# Patient Record
Sex: MALE | Race: WHITE | HISPANIC OR LATINO | URBAN - METROPOLITAN AREA
[De-identification: names, ages, dates, MRNs, and addresses within clinical notes are randomized per-mention and may not be internally consistent; named-entity substitution may affect disease eponyms.]

---

## 2020-12-19 ENCOUNTER — EMERGENCY (EMERGENCY)
Facility: HOSPITAL | Age: 60
LOS: 1 days | Discharge: HOME | End: 2020-12-19
Admitting: EMERGENCY MEDICINE
Payer: COMMERCIAL

## 2020-12-19 VITALS
WEIGHT: 175.05 LBS | OXYGEN SATURATION: 98 % | RESPIRATION RATE: 16 BRPM | HEIGHT: 68 IN | HEART RATE: 84 BPM | DIASTOLIC BLOOD PRESSURE: 111 MMHG | SYSTOLIC BLOOD PRESSURE: 150 MMHG | TEMPERATURE: 98 F
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
## 2020-12-19 DIAGNOSIS — Z20.828 CONTACT WITH AND (SUSPECTED) EXPOSURE TO OTHER VIRAL COMMUNICABLE DISEASES: ICD-10-CM

## 2020-12-19 DIAGNOSIS — R42 DIZZINESS AND GIDDINESS: ICD-10-CM

## 2020-12-19 DIAGNOSIS — Z23 ENCOUNTER FOR IMMUNIZATION: ICD-10-CM

## 2020-12-19 DIAGNOSIS — S01.81XA LACERATION WITHOUT FOREIGN BODY OF OTHER PART OF HEAD, INITIAL ENCOUNTER: ICD-10-CM
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
## 2020-12-19 DIAGNOSIS — S01.112A LACERATION WITHOUT FOREIGN BODY OF LEFT EYELID AND PERIOCULAR AREA, INITIAL ENCOUNTER: ICD-10-CM

## 2020-12-19 PROCEDURE — 99284 EMERGENCY DEPT VISIT MOD MDM: CPT | Mod: CR,25

## 2020-12-19 PROCEDURE — 70450 CT HEAD/BRAIN W/O DYE: CPT | Mod: 26

## 2020-12-19 PROCEDURE — 12052 INTMD RPR FACE/MM 2.6-5.0 CM: CPT

## 2020-12-19 RX ORDER — TETANUS TOXOID, REDUCED DIPHTHERIA TOXOID AND ACELLULAR PERTUSSIS VACCINE, ADSORBED 5; 2.5; 8; 8; 2.5 [IU]/.5ML; [IU]/.5ML; UG/.5ML; UG/.5ML; UG/.5ML
0.5 SUSPENSION INTRAMUSCULAR ONCE
Refills: 0 | Status: COMPLETED | OUTPATIENT
Start: 2020-12-19 | End: 2020-12-19

## 2020-12-19 RX ORDER — ACETAMINOPHEN 500 MG
650 TABLET ORAL ONCE
Refills: 0 | Status: COMPLETED | OUTPATIENT
Start: 2020-12-19 | End: 2020-12-19

## 2020-12-19 RX ADMIN — Medication 650 MILLIGRAM(S): at 23:25

## 2020-12-19 RX ADMIN — TETANUS TOXOID, REDUCED DIPHTHERIA TOXOID AND ACELLULAR PERTUSSIS VACCINE, ADSORBED 0.5 MILLILITER(S): 5; 2.5; 8; 8; 2.5 SUSPENSION INTRAMUSCULAR at 22:47

## 2020-12-19 RX ADMIN — Medication 650 MILLIGRAM(S): at 22:47

## 2020-12-19 NOTE — ED PROVIDER NOTE - CLINICAL SUMMARY MEDICAL DECISION MAKING FREE TEXT BOX
pt with laceration to the L facial region s/p witnessed mechanical fall at the bar tonight, wound copiously irrigated under high pressure and repaired at bedside, NV intact pre and post lac repair, CTH with no acute ICH, wound care instructions provided, instructed to return in 5-7 days for suture removal. Also requested for COVID swab for travel purposes.  Asymptomatic and swab obtained/sent

## 2020-12-19 NOTE — ED PROVIDER NOTE - NSFOLLOWUPINSTRUCTIONS_ED_ALL_ED_FT
NON-DISSOLVABLE SUTURES  * Please note minor swelling, redness, pain, itching, and occasional bleeding (that’s controlled by direct pressure) are common with all wounds and normally will go away as wound heals     • Keep dressing clean and dry for 24 hours   • May gently clean wound with soap and water after 24 hours to avoid crusting – PAT DRY after each wash  • Open wound to air or loosen Band-Aid to allow aeration   • Apply Bacitracin or Neosporin ointment twice daily    • Return in 5-7 days for suture removal    PLEASE SEEK MEDICAL ATTENTION OR RETURN TO ER IMMEDIATELY IF:  * Swelling, redness, or pain increases and cannot be controlled by prescribed pain medication  * Wound feels warm to touch   * Fever >100.4F  * Wound edges reopen/separate   * Foul smelling discharge from wound   * Uncontrolled bleeding with direct pressure  * Increased numbness/tingling/discoloration of wound site

## 2020-12-19 NOTE — ED PROVIDER NOTE - OBJECTIVE STATEMENT
61 yo M with PMHx of , last tetanus unknown, BIBA for 61 yo M with PMHx of GERD, last tetanus unknown, not on any AC, presenting c/o facial laceration s/p fall today.  Pt reports having heavy alcohol consumption tonight, went to the bathroom and hit his head against the ground and sustained laceration to the L eyebrow region.  +Witnessed by friend at the bar.  Denies prodromes, LOC, bleeding, dizziness, SOB, CP, palpitations, N/V, change in ROM/sensation, abdominal/back/neck pain, focal weakness, change in vision/mental status/speech, diplopia, paresthesia, and malaise. Pt is ambulatory s/p fall.

## 2020-12-19 NOTE — ED PROVIDER NOTE - PATIENT PORTAL LINK FT
You can access the FollowMyHealth Patient Portal offered by [facility] by registering at the following website: [email or web address]. By joining [employer]’s FollowMyHealth portal, you will also be able to view your health information using other applications (apps) compatible with our system.

## 2020-12-19 NOTE — ED PROVIDER NOTE - PHYSICAL EXAMINATION
Vital Signs - nursing notes reviewed and confirmed  Gen - WDWN M, NAD, comfortable and non-toxic appearing, speaking in full sentences   Skin - warm, dry, 4cm stellate laceration with spidering surrounding the wound, +active bleeding, no FB or bony exposure   HEENT - NC, +mild periorbital ecchymosis to the L lateral supraorbital region, PERRL, EOMI, no conjunctival injection, moist oral mucosa, TM intact b/l with good cone of lights, o/p clear with no erythema, edema, or exudate, uvula midline, airway patent, neck supple and NT, FROM  CV - S1S2, R/R/R  Resp - respiration non-labored, CTAB, symmetric bs b/l, no r/r/w  GI - NABS, soft, ND, NT, no rebound or guarding, no CVAT b/l   MS - w/w/p, no c/c/e, calves supple and NT, distal pulses symmetric b/l, brisk cap refills, +SILT  Neuro - AxOx3, no focal neuro deficits, CN II-XII grossly intact, cerebellar function intact, negative pronator drift, negative nystagmus, ambulatory with unsteady gait Vital Signs - nursing notes reviewed and confirmed  Gen - WDWN M, NAD, comfortable and non-toxic appearing, speaking in full sentences   Skin - warm, dry, 4cm stellate laceration with spidering of the laceration surrounding the wound, +active bleeding, no FB or bony exposure   HEENT - NC, +mild periorbital ecchymosis to the L lateral supraorbital region, PERRL, EOMI, no conjunctival injection, moist oral mucosa, TM intact b/l with good cone of lights, o/p clear with no erythema, edema, or exudate, uvula midline, airway patent, neck supple and NT, FROM  CV - S1S2, R/R/R  Resp - respiration non-labored, CTAB, symmetric bs b/l, no r/r/w  GI - NABS, soft, ND, NT, no rebound or guarding, no CVAT b/l   MS - w/w/p, no c/c/e, calves supple and NT, distal pulses symmetric b/l, brisk cap refills, +SILT  Neuro - AxOx3, no focal neuro deficits, CN II-XII grossly intact, cerebellar function intact, negative pronator drift, negative nystagmus, ambulatory with unsteady gait

## 2020-12-19 NOTE — ED ADULT TRIAGE NOTE - CHIEF COMPLAINT QUOTE
fall tonight outside bar, sustained lac above left eyebrow, denies loc, does not take blood thinners, pt intoxicated ++ fall tonight outside bar, sustained lac above left eyebrow, denies loc, does not take blood thinners, pt intoxicated ++, full recall of events, no histroy of hypertension

## 2020-12-19 NOTE — ED PROVIDER NOTE - CARE PLAN
Principal Discharge DX:	Facial laceration, initial encounter  Secondary Diagnosis:	Head trauma  Secondary Diagnosis:	Screening for viral disease

## 2020-12-20 VITALS
HEART RATE: 88 BPM | OXYGEN SATURATION: 99 % | TEMPERATURE: 98 F | DIASTOLIC BLOOD PRESSURE: 85 MMHG | RESPIRATION RATE: 16 BRPM | SYSTOLIC BLOOD PRESSURE: 130 MMHG

## 2020-12-20 LAB
GLUCOSE BLDC GLUCOMTR-MCNC: 93 MG/DL — SIGNIFICANT CHANGE UP (ref 70–99)
SARS-COV-2 RNA SPEC QL NAA+PROBE: SIGNIFICANT CHANGE UP

## 2020-12-20 NOTE — ED ADULT NURSE REASSESSMENT NOTE - NS ED NURSE REASSESS COMMENT FT1
Rcvd pt from [facility]OR[facility] Fonseca. Pt resting in stretcher, awake and alert and speaking in full and complete sentences. Pending radiology results.

## 2021-06-29 PROBLEM — K21.9 GASTRO-ESOPHAGEAL REFLUX DISEASE WITHOUT ESOPHAGITIS: Chronic | Status: ACTIVE | Noted: 2020-12-19

## 2021-07-01 ENCOUNTER — APPOINTMENT (OUTPATIENT)
Dept: OTOLARYNGOLOGY | Facility: CLINIC | Age: 61
End: 2021-07-01
Payer: COMMERCIAL

## 2021-07-01 VITALS
DIASTOLIC BLOOD PRESSURE: 87 MMHG | HEART RATE: 86 BPM | TEMPERATURE: 98.3 F | OXYGEN SATURATION: 99 % | SYSTOLIC BLOOD PRESSURE: 123 MMHG

## 2021-07-01 DIAGNOSIS — Z78.9 OTHER SPECIFIED HEALTH STATUS: ICD-10-CM

## 2021-07-01 DIAGNOSIS — J04.0 ACUTE LARYNGITIS: ICD-10-CM

## 2021-07-01 DIAGNOSIS — Z72.89 OTHER PROBLEMS RELATED TO LIFESTYLE: ICD-10-CM

## 2021-07-01 DIAGNOSIS — H92.02 OTALGIA, LEFT EAR: ICD-10-CM

## 2021-07-01 DIAGNOSIS — H66.90 OTITIS MEDIA, UNSPECIFIED, UNSPECIFIED EAR: ICD-10-CM

## 2021-07-01 DIAGNOSIS — Z82.49 FAMILY HISTORY OF ISCHEMIC HEART DISEASE AND OTHER DISEASES OF THE CIRCULATORY SYSTEM: ICD-10-CM

## 2021-07-01 PROBLEM — Z00.00 ENCOUNTER FOR PREVENTIVE HEALTH EXAMINATION: Status: ACTIVE | Noted: 2021-07-01

## 2021-07-01 PROCEDURE — 92557 COMPREHENSIVE HEARING TEST: CPT

## 2021-07-01 PROCEDURE — 99204 OFFICE O/P NEW MOD 45 MIN: CPT | Mod: 25

## 2021-07-01 PROCEDURE — 99072 ADDL SUPL MATRL&STAF TM PHE: CPT

## 2021-07-01 PROCEDURE — 31575 DIAGNOSTIC LARYNGOSCOPY: CPT

## 2021-07-01 PROCEDURE — 92550 TYMPANOMETRY & REFLEX THRESH: CPT

## 2021-07-01 NOTE — REVIEW OF SYSTEMS
[Patient Intake Form Reviewed] : Patient intake form was reviewed [Ear Pain] : ear pain [Hearing Loss] : hearing loss [Ear Noises] : ear noises [Nasal Congestion] : nasal congestion [Hoarseness] : hoarseness [Throat Pain] : throat pain [Throat Dryness] : throat dryness [As Noted in HPI] : as noted in HPI [Heartburn] : heartburn [Negative] : Heme/Lymph

## 2021-07-06 ENCOUNTER — APPOINTMENT (OUTPATIENT)
Dept: OTOLARYNGOLOGY | Facility: CLINIC | Age: 61
End: 2021-07-06
Payer: COMMERCIAL

## 2021-07-06 VITALS
HEIGHT: 68 IN | DIASTOLIC BLOOD PRESSURE: 87 MMHG | BODY MASS INDEX: 26.22 KG/M2 | OXYGEN SATURATION: 98 % | WEIGHT: 173 LBS | SYSTOLIC BLOOD PRESSURE: 144 MMHG | HEART RATE: 87 BPM | TEMPERATURE: 98.3 F

## 2021-07-06 PROCEDURE — 92550 TYMPANOMETRY & REFLEX THRESH: CPT

## 2021-07-06 PROCEDURE — 92557 COMPREHENSIVE HEARING TEST: CPT

## 2021-07-06 PROCEDURE — 99072 ADDL SUPL MATRL&STAF TM PHE: CPT

## 2021-07-06 PROCEDURE — 99213 OFFICE O/P EST LOW 20 MIN: CPT

## 2021-07-06 NOTE — PHYSICAL EXAM
[de-identified] :   Left ear Otitis Media . hearing loss [Normal] : mucosa is normal [Midline] : trachea located in midline position

## 2021-07-06 NOTE — REASON FOR VISIT
[Subsequent Evaluation] : a subsequent evaluation for [FreeTextEntry2] : Persistent ET dysfunction Lt and nasal obstr, improved slightly w steroids

## 2021-07-28 NOTE — HISTORY OF PRESENT ILLNESS
[de-identified] : 60 years old male patient with history of left ear otalgia for the past 2 weeks.   Patient is present today in the office with Resolving  Left ear Otitis Media .  Reflux  Laryngitis.

## 2021-07-28 NOTE — PROCEDURE
[Image(s) Captured] : image(s) captured and filed [Topical Lidocaine] : topical lidocaine [Oxymetazoline HCl] : oxymetazoline HCl [Flexible Endoscope] : examined with the flexible endoscope [Serial Number: ___] : Serial Number: [unfilled] [Glottis Arytenoid Cartilages Erythema] : bilateral arytenoid ~M erythema [de-identified] : Resolving  Left ear Otitis Media .  Reflux  Laryngitis.   \par ETD

## 2021-07-28 NOTE — PHYSICAL EXAM
[Normal] : mucosa is normal [Midline] : trachea located in midline position [de-identified] :   Left ear Otitis Media . hearing loss

## 2021-07-28 NOTE — REASON FOR VISIT
[Initial Evaluation] : an initial evaluation for [FreeTextEntry2] : left ear otalgia for the past 2 weeks.  Patient states his level of verity is a level 7 out of 10 and it occurs constant.  Patient states nothing helps to improve or worsens his left ear otalgia for the past 2 weeks.

## 2021-07-28 NOTE — CONSULT LETTER
[Dear  ___] : Dear  [unfilled], [Consult Letter:] : I had the pleasure of evaluating your patient, [unfilled]. [Please see my note below.] : Please see my note below. [Consult Closing:] : Thank you very much for allowing me to participate in the care of this patient.  If you have any questions, please do not hesitate to contact me. [Sincerely,] : Sincerely, [FreeTextEntry3] : Hugo Carlisle MD, FACS\par Professor of Otolaryngology, [employer] at [facility]\par Director, [facility], Department of Otolaryngology, [facility]\par , Head & Neck Service Line, [facility]\par

## 2022-08-16 NOTE — ED ADULT NURSE NOTE - CHIEF COMPLAINT QUOTE
Head , normocephalic , atraumatic , Face , Face within normal limits , Ears , External ears within normal limits , Nose/Nasopharynx , External nose  normal appearance , Mouth and Throat , Oral cavity appearance normal , Head,  normocephalic,  atraumatic,  Face,  Face within normal limits,  Ears,  External ears within normal limits,  Nose/Nasopharynx,  External nose  normal appearance,  nares patent,  no nasal discharge,  Mouth and Throat,  Oral cavity appearance normal,  Breath odor normal,  Lips,  Appearance normal
fall tonight outside bar, sustained lac above left eyebrow, denies loc, does not take blood thinners, pt intoxicated ++, full recall of events, no histroy of hypertension